# Patient Record
Sex: MALE | Race: WHITE | NOT HISPANIC OR LATINO
[De-identification: names, ages, dates, MRNs, and addresses within clinical notes are randomized per-mention and may not be internally consistent; named-entity substitution may affect disease eponyms.]

---

## 2018-01-03 ENCOUNTER — APPOINTMENT (OUTPATIENT)
Dept: GASTROENTEROLOGY | Facility: CLINIC | Age: 68
End: 2018-01-03
Payer: MEDICARE

## 2018-01-03 VITALS
WEIGHT: 195 LBS | HEART RATE: 78 BPM | TEMPERATURE: 98.4 F | OXYGEN SATURATION: 97 % | BODY MASS INDEX: 28.88 KG/M2 | SYSTOLIC BLOOD PRESSURE: 138 MMHG | DIASTOLIC BLOOD PRESSURE: 80 MMHG | HEIGHT: 69 IN

## 2018-01-03 DIAGNOSIS — Z82.49 FAMILY HISTORY OF ISCHEMIC HEART DISEASE AND OTHER DISEASES OF THE CIRCULATORY SYSTEM: ICD-10-CM

## 2018-01-03 DIAGNOSIS — Z80.51 FAMILY HISTORY OF MALIGNANT NEOPLASM OF KIDNEY: ICD-10-CM

## 2018-01-03 DIAGNOSIS — Z78.9 OTHER SPECIFIED HEALTH STATUS: ICD-10-CM

## 2018-01-03 DIAGNOSIS — Z87.891 PERSONAL HISTORY OF NICOTINE DEPENDENCE: ICD-10-CM

## 2018-01-03 PROCEDURE — 99213 OFFICE O/P EST LOW 20 MIN: CPT

## 2018-01-30 ENCOUNTER — APPOINTMENT (OUTPATIENT)
Dept: GASTROENTEROLOGY | Facility: AMBULATORY MEDICAL SERVICES | Age: 68
End: 2018-01-30
Payer: MEDICARE

## 2018-01-30 PROCEDURE — 45378 DIAGNOSTIC COLONOSCOPY: CPT

## 2018-02-02 ENCOUNTER — MESSAGE (OUTPATIENT)
Age: 68
End: 2018-02-02

## 2018-02-27 ENCOUNTER — APPOINTMENT (OUTPATIENT)
Dept: GASTROENTEROLOGY | Facility: AMBULATORY MEDICAL SERVICES | Age: 68
End: 2018-02-27

## 2018-02-27 ENCOUNTER — APPOINTMENT (OUTPATIENT)
Dept: GASTROENTEROLOGY | Facility: AMBULATORY MEDICAL SERVICES | Age: 68
End: 2018-02-27
Payer: MEDICARE

## 2018-02-27 PROCEDURE — 45380 COLONOSCOPY AND BIOPSY: CPT

## 2018-03-12 ENCOUNTER — APPOINTMENT (OUTPATIENT)
Dept: CARDIOLOGY | Facility: CLINIC | Age: 68
End: 2018-03-12
Payer: MEDICARE

## 2018-03-12 PROCEDURE — 93015 CV STRESS TEST SUPVJ I&R: CPT

## 2018-03-26 ENCOUNTER — APPOINTMENT (OUTPATIENT)
Dept: GASTROENTEROLOGY | Facility: CLINIC | Age: 68
End: 2018-03-26
Payer: MEDICARE

## 2018-03-26 VITALS
HEIGHT: 69 IN | BODY MASS INDEX: 28.58 KG/M2 | TEMPERATURE: 98.4 F | SYSTOLIC BLOOD PRESSURE: 118 MMHG | HEART RATE: 87 BPM | OXYGEN SATURATION: 96 % | WEIGHT: 193 LBS | DIASTOLIC BLOOD PRESSURE: 80 MMHG

## 2018-03-26 DIAGNOSIS — Z86.010 PERSONAL HISTORY OF COLONIC POLYPS: ICD-10-CM

## 2018-03-26 DIAGNOSIS — K57.30 DIVERTICULOSIS OF LARGE INTESTINE W/OUT PERFORATION OR ABSCESS W/OUT BLEEDING: ICD-10-CM

## 2018-03-26 DIAGNOSIS — K62.7 RADIATION PROCTITIS: ICD-10-CM

## 2018-03-26 PROCEDURE — 99213 OFFICE O/P EST LOW 20 MIN: CPT

## 2018-04-25 ENCOUNTER — RECORD ABSTRACTING (OUTPATIENT)
Age: 68
End: 2018-04-25

## 2018-04-25 DIAGNOSIS — N52.9 MALE ERECTILE DYSFUNCTION, UNSPECIFIED: ICD-10-CM

## 2018-04-25 DIAGNOSIS — K21.9 GASTRO-ESOPHAGEAL REFLUX DISEASE W/OUT ESOPHAGITIS: ICD-10-CM

## 2018-04-25 DIAGNOSIS — J30.2 OTHER SEASONAL ALLERGIC RHINITIS: ICD-10-CM

## 2018-04-25 DIAGNOSIS — G47.00 INSOMNIA, UNSPECIFIED: ICD-10-CM

## 2018-04-25 DIAGNOSIS — M54.12 RADICULOPATHY, CERVICAL REGION: ICD-10-CM

## 2018-04-25 RX ORDER — IPRATROPIUM BROMIDE AND ALBUTEROL 20; 100 UG/1; UG/1
20-100 SPRAY, METERED RESPIRATORY (INHALATION)
Qty: 4 | Refills: 0 | Status: ACTIVE | COMMUNITY
Start: 2017-10-18

## 2018-04-25 RX ORDER — SODIUM SULFATE, POTASSIUM SULFATE, MAGNESIUM SULFATE 17.5; 3.13; 1.6 G/ML; G/ML; G/ML
17.5-3.13-1.6 SOLUTION, CONCENTRATE ORAL
Qty: 1 | Refills: 0 | Status: DISCONTINUED | COMMUNITY
Start: 2018-01-03 | End: 2018-04-25

## 2018-04-25 RX ORDER — SODIUM SULFATE, POTASSIUM SULFATE, MAGNESIUM SULFATE 17.5; 3.13; 1.6 G/ML; G/ML; G/ML
17.5-3.13-1.6 SOLUTION, CONCENTRATE ORAL
Qty: 1 | Refills: 0 | Status: DISCONTINUED | COMMUNITY
Start: 2018-02-02 | End: 2018-04-25

## 2018-04-25 RX ORDER — FOLIC ACID 1 MG/1
1 TABLET ORAL DAILY
Qty: 90 | Refills: 3 | Status: ACTIVE | COMMUNITY
Start: 2017-10-30

## 2018-04-25 RX ORDER — MOMETASONE FUROATE MONOHYDRATE 50 UG/1
50 SPRAY, METERED NASAL
Refills: 0 | Status: ACTIVE | COMMUNITY

## 2018-04-25 RX ORDER — SILDENAFIL CITRATE 100 MG/1
TABLET, FILM COATED ORAL
Refills: 0 | Status: ACTIVE | COMMUNITY

## 2018-04-25 RX ORDER — MONTELUKAST SODIUM 10 MG/1
10 TABLET, FILM COATED ORAL DAILY
Qty: 90 | Refills: 0 | Status: ACTIVE | COMMUNITY

## 2018-04-25 RX ORDER — ZOLPIDEM TARTRATE 12.5 MG/1
12.5 TABLET, COATED ORAL
Refills: 0 | Status: ACTIVE | COMMUNITY

## 2018-04-25 RX ORDER — LOSARTAN POTASSIUM AND HYDROCHLOROTHIAZIDE 12.5; 5 MG/1; MG/1
50-12.5 TABLET ORAL DAILY
Refills: 0 | Status: ACTIVE | COMMUNITY
Start: 2017-08-01

## 2018-04-27 ENCOUNTER — APPOINTMENT (OUTPATIENT)
Dept: CARDIOLOGY | Facility: CLINIC | Age: 68
End: 2018-04-27
Payer: MEDICARE

## 2018-04-27 VITALS
WEIGHT: 190 LBS | DIASTOLIC BLOOD PRESSURE: 80 MMHG | HEART RATE: 74 BPM | HEIGHT: 69 IN | RESPIRATION RATE: 18 BRPM | SYSTOLIC BLOOD PRESSURE: 120 MMHG | BODY MASS INDEX: 28.14 KG/M2

## 2018-04-27 DIAGNOSIS — Z85.46 PERSONAL HISTORY OF MALIGNANT NEOPLASM OF PROSTATE: ICD-10-CM

## 2018-04-27 PROCEDURE — 93000 ELECTROCARDIOGRAM COMPLETE: CPT

## 2018-04-27 PROCEDURE — 99214 OFFICE O/P EST MOD 30 MIN: CPT | Mod: 25

## 2018-05-31 ENCOUNTER — APPOINTMENT (OUTPATIENT)
Dept: CARDIOLOGY | Facility: CLINIC | Age: 68
End: 2018-05-31
Payer: MEDICARE

## 2018-05-31 PROCEDURE — 93306 TTE W/DOPPLER COMPLETE: CPT

## 2018-07-23 PROBLEM — J30.2 SEASONAL ALLERGIES: Status: ACTIVE | Noted: 2018-04-25

## 2019-04-18 RX ORDER — OXYCODONE AND ACETAMINOPHEN 5; 325 MG/1; MG/1
5-325 TABLET ORAL
Qty: 30 | Refills: 0 | Status: ACTIVE | COMMUNITY
Start: 2017-10-30

## 2019-04-18 RX ORDER — TIZANIDINE 4 MG/1
4 TABLET ORAL
Qty: 30 | Refills: 0 | Status: ACTIVE | COMMUNITY
Start: 2017-08-01

## 2019-05-02 ENCOUNTER — APPOINTMENT (OUTPATIENT)
Dept: CARDIOLOGY | Facility: CLINIC | Age: 69
End: 2019-05-02
Payer: MEDICARE

## 2019-05-02 ENCOUNTER — TRANSCRIPTION ENCOUNTER (OUTPATIENT)
Age: 69
End: 2019-05-02

## 2019-05-02 ENCOUNTER — NON-APPOINTMENT (OUTPATIENT)
Age: 69
End: 2019-05-02

## 2019-05-02 VITALS
SYSTOLIC BLOOD PRESSURE: 120 MMHG | HEART RATE: 76 BPM | WEIGHT: 184 LBS | RESPIRATION RATE: 16 BRPM | BODY MASS INDEX: 27.25 KG/M2 | DIASTOLIC BLOOD PRESSURE: 70 MMHG | HEIGHT: 69 IN

## 2019-05-02 PROCEDURE — 99214 OFFICE O/P EST MOD 30 MIN: CPT

## 2019-05-02 PROCEDURE — 93000 ELECTROCARDIOGRAM COMPLETE: CPT

## 2019-05-02 RX ORDER — TAMSULOSIN HYDROCHLORIDE 0.4 MG/1
0.4 CAPSULE ORAL DAILY
Refills: 0 | Status: ACTIVE | COMMUNITY
Start: 2019-02-01

## 2019-05-02 NOTE — ASSESSMENT
[FreeTextEntry1] : ECG: Normal sinus rhythm at 76 beats per minute. Normal axis  1' AVB  No significant ischemic changes\par \par Laboratory data 3/20/19 call\par Cholesterol 138\par HDL 50\par LDL 71\par Electrolytes and LFTs are normal\par Hemoglobin 16.3\par \par CT of the chest 4/1/19\par Scattered atherosclerotic calcifications of the left coronary artery.\par Subcentimeter pulmonary nodules noted\par These are unchanged in comparison to a prior CAT scan of one year ago.\par \par Echocardiogram 5/31/18:\par Normal cardiac chamber sizes and function.\par Left ventricular ejection fraction 60%\par Mild dilatation of the ascending aorta 3.8-3.9 cm. Appears to be slightly increased in comparison to the prior echo.\par \par Stress test 3/12/18.\par Exercise 9 minutes (10 minutes). Peak heart rate 153\par Blood pressure response normal\par Chest associated with leg fatigue but no chest pain.\par ECG nonischemic.\par \par Impression\par 1. Hypertension seems to be quite adequately controlled on current regimen.\par 2. Exercise tolerance is reasonable without any evidence of exercise-induced ischemia.\par 3. Lipid  Seem adequately controlled\par 4. Weight (BMI 28) has remained a bit increased and further efforts with regard to diet and exercise should be applied to this.\par 5. Coronary atherosclerotic plaquing noted on the CT scan.\par 6. Mild dilatation of the ascending thoracic aorta.\par \par Plan:\par No indication for any change in cardiac management.\par Laboratory data to be obtained in 6 months with primary care physician.\par Repeat echocardiogram to reassess ascending thoracic aorta\par Patient to contact me to participate in his symptoms.

## 2019-05-02 NOTE — HISTORY OF PRESENT ILLNESS
[FreeTextEntry1] : Feels relatively well.\par Home blood pressure is 120/70 mm Hg\par No significant exertional chest pain, shortness of breath.Patient denies any chest pain, shortness of breath, palpitations, PND, orthopnea or edema\par \par Other medical history relates to his history of prostate cancer treated 10 years ago with radiation therapy and some chronic radiation  colitis\par \par Recent CT diagnosis of multiple subcentimeter pulmonary nodules. He is being followed as part of the [facility] program.

## 2019-05-02 NOTE — REASON FOR VISIT
[FreeTextEntry1] : Patient returns here for followup with regard to management of problems which include:\par \par 1. History of hypertension\par \par 2. History of hyperlipidemia\par \par 3. Followup with regard to a recent stress test\par \par \par

## 2019-10-01 RX ORDER — BUDESONIDE AND FORMOTEROL FUMARATE DIHYDRATE 160; 4.5 UG/1; UG/1
AEROSOL RESPIRATORY (INHALATION)
Refills: 0 | Status: ACTIVE | COMMUNITY

## 2019-10-03 ENCOUNTER — APPOINTMENT (OUTPATIENT)
Dept: CARDIOLOGY | Facility: CLINIC | Age: 69
End: 2019-10-03
Payer: MEDICARE

## 2019-10-03 ENCOUNTER — NON-APPOINTMENT (OUTPATIENT)
Age: 69
End: 2019-10-03

## 2019-10-03 VITALS
DIASTOLIC BLOOD PRESSURE: 80 MMHG | HEART RATE: 70 BPM | OXYGEN SATURATION: 96 % | WEIGHT: 180 LBS | BODY MASS INDEX: 26.66 KG/M2 | SYSTOLIC BLOOD PRESSURE: 110 MMHG | HEIGHT: 69 IN | RESPIRATION RATE: 16 BRPM

## 2019-10-03 DIAGNOSIS — Z00.00 ENCOUNTER FOR GENERAL ADULT MEDICAL EXAMINATION W/OUT ABNORMAL FINDINGS: ICD-10-CM

## 2019-10-03 PROCEDURE — 93000 ELECTROCARDIOGRAM COMPLETE: CPT

## 2019-10-03 PROCEDURE — 99214 OFFICE O/P EST MOD 30 MIN: CPT

## 2019-10-03 NOTE — REVIEW OF SYSTEMS
[Heartburn] : heartburn [Urinary Frequency] : urinary frequency [Negative] : Heme/Lymph [Recent Weight Loss (___ Lbs)] : recent [unfilled] ~Ulb weight loss

## 2019-10-03 NOTE — ASSESSMENT
[FreeTextEntry1] : ECG: Normal sinus rhythm at 70 beats per minute and WNL.\par \par Laboratory data 3/20/19 \par Cholesterol 138\par HDL 50\par LDL 71\par Electrolytes and LFTs are normal\par Hemoglobin 16.3\par \par CT of the chest 4/1/19\par Scattered atherosclerotic calcifications of the left coronary artery.\par Subcentimeter pulmonary nodules noted\par These are unchanged in comparison to a prior CAT scan of one year ago.\par \par Echocardiogram 5/31/18:\par Normal cardiac chamber sizes and function.\par Left ventricular ejection fraction 60%\par Mild dilatation of the ascending aorta 3.8-3.9 cm. Appears to be slightly increased in comparison to the prior echo.\par \par Stress test 3/12/18.\par Exercise 9 minutes (10 minutes). Peak heart rate 153\par Blood pressure response normal\par Chest associated with leg fatigue but no chest pain.\par ECG nonischemic.\par \par Impression\par 1. Hypertension seems to be quite adequately controlled on current regimen. Today 110/80\par 2. Exercise tolerance is reasonable without any evidence of exercise-induced ischemia.\par 3. March 2019  lipids  Seem adequately controlled\par 4. Down 4 lbs since May, BMI now 26.\par 5. Coronary atherosclerotic plaquing noted on the CT scan competed in April of 2019.\par 6. Mild dilatation of the ascending thoracic aorta in 2018 echo\par \par Plan:\par 1.No indication for any change in cardiac management.\par 2. Will obtain lab data for review from PCP\par 3.Repeat echocardiogram  now to reassess ascending thoracic aorta\par \par \par Patient to contact me to participate in his symptoms.\par \par Clinical follow up in 6 months

## 2019-10-03 NOTE — HISTORY OF PRESENT ILLNESS
[FreeTextEntry1] : Continue to feels relatively well.\par \par Recently had imaging of his brain completed for forgetfulness, this and his most recent blood work results are not available for review.\par \par S/P ablation for what sounds like a cervical radiculopathy.\par \par Physically active, goes to the 3 times a week and now participates in yoga. There is no exertional discomfort. \par \par No significant exertional chest pain, shortness of breath.Patient denies any chest pain, shortness of breath, palpitations, PND, orthopnea or edema\par \par Other medical history relates to his history of prostate cancer treated 10 years ago with radiation therapy and some chronic radiation  colitis\par \par Recent CT diagnosis of multiple subcentimeter pulmonary nodules. He is being followed as part of the [facility] program.\par \par

## 2019-10-30 ENCOUNTER — APPOINTMENT (OUTPATIENT)
Dept: CARDIOLOGY | Facility: CLINIC | Age: 69
End: 2019-10-30
Payer: MEDICARE

## 2019-10-30 PROCEDURE — 93306 TTE W/DOPPLER COMPLETE: CPT

## 2020-04-02 RX ORDER — ESOMEPRAZOLE MAGNESIUM 20 MG/1
20 CAPSULE, DELAYED RELEASE ORAL DAILY
Refills: 0 | Status: ACTIVE | COMMUNITY

## 2020-04-02 NOTE — REVIEW OF SYSTEMS
[Recent Weight Loss (___ Lbs)] : recent [unfilled] ~Ulb weight loss [Heartburn] : heartburn [Urinary Frequency] : urinary frequency [Negative] : Heme/Lymph

## 2020-04-07 ENCOUNTER — APPOINTMENT (OUTPATIENT)
Dept: CARDIOLOGY | Facility: CLINIC | Age: 70
End: 2020-04-07

## 2020-04-07 ENCOUNTER — APPOINTMENT (OUTPATIENT)
Dept: CARDIOLOGY | Facility: CLINIC | Age: 70
End: 2020-04-07
Payer: MEDICARE

## 2020-04-07 PROCEDURE — 99214 OFFICE O/P EST MOD 30 MIN: CPT | Mod: 95

## 2020-04-07 RX ORDER — NAPROXEN 500 MG/1
TABLET ORAL
Refills: 0 | Status: ACTIVE | COMMUNITY

## 2020-04-07 NOTE — ASSESSMENT
[FreeTextEntry1] : Lab data 12/11/19:\par Cholesterol 132\par HDL 42\par LDL 70\par Triglycerides 122\par LFTs electrolytes are normal\par \par \par Laboratory data 3/20/19 \par Cholesterol 138\par HDL 50\par LDL 71\par Electrolytes and LFTs are normal\par Hemoglobin 16.3\par \par Echocardiogram 10/30/19:\par Normal LV size and systolic function with evidence of diastolic dysfunction.\par Mild dilatation of the ascending aorta 4.1 cm increase of 3.9 cm.\par No significant valvular abnormalities.\par \par CT of the chest 4/1/19\par Scattered atherosclerotic calcifications of the left coronary artery.\par Subcentimeter pulmonary nodules noted\par These are unchanged in comparison to a prior CAT scan of one year ago.\par \par Echocardiogram 5/31/18:\par Normal cardiac chamber sizes and function.\par Left ventricular ejection fraction 60%\par Mild dilatation of the ascending aorta 3.8-3.9 cm. Appears to be slightly increased in comparison to the prior echo.\par \par Stress test 3/12/18.\par Exercise 9 minutes (10 minutes). Peak heart rate 153\par Blood pressure response normal\par Chest associated with leg fatigue but no chest pain.\par ECG nonischemic.\par \par Impression\par 1. Hypertension seems to be quite adequately controlled on current regimen. 110/80 mm Hg at home\par 2. Exercise tolerance is reasonable without any evidence of exercise-induced ischemia.\par 3. lipids  Seem adequately controlled though cannot get labs at this time\par 4. Maintaining weight at 180 lbs\par 5. Coronary atherosclerotic plaquing noted on the CT scan competed in April of 2019.\par 6. Mild dilatation of the ascending thoracic aorta with slight increase\par \par Plan:\par 1.No indication for any change in cardiac management.\par 2. Will obtain lab data for review when available\par 3.Repeat echocardiogram  now to reassess ascending thoracic aorta 10/20\par 4. f/U 6 months\par \par Patient to contact me to participate in his symptoms.\par \par Clinical follow up in 6 months

## 2020-04-07 NOTE — REASON FOR VISIT
[FreeTextEntry1] : Patient for followup with regard to management of problems which include:\par \par 1. History of hypertension\par \par 2. History of hyperlipidemia\par \par 3.Dilated ascending aorta last echo 10/30/19\par \par \par

## 2020-04-07 NOTE — PHYSICAL EXAM
[General Appearance - Well Developed] : well developed [Normal Appearance] : normal appearance [General Appearance - Well Nourished] : well nourished [Normal Conjunctiva] : the conjunctiva exhibited no abnormalities [Normal Oral Mucosa] : normal oral mucosa [JVD Elevated _____cm] : JVD elevated [unfilled] ~U cm above clavicle [] : no respiratory distress [Respiration, Rhythm And Depth] : normal respiratory rhythm and effort [Skin Color & Pigmentation] : normal skin color and pigmentation [Skin Turgor] : normal skin turgor [Oriented To Time, Place, And Person] : oriented to person, place, and time [Impaired Insight] : insight and judgment were intact [No Anxiety] : not feeling anxious

## 2020-04-07 NOTE — HISTORY OF PRESENT ILLNESS
[FreeTextEntry1] : Continue to feels relatively well.\par \par Recently had imaging of his brain completed for forgetfulness, this and his most recent blood work results are not available for review.\par \par S/P ablation for what sounds like a cervical radiculopathy.\par \par Physically active, goes to the 3 times a week and now participates in yoga. There is no exertional discomfort. Walking frequently\par \par No significant exertional chest pain, shortness of breath.Patient denies any chest pain, shortness of breath, palpitations, PND, orthopnea or edema\par \par Other medical history relates to his history of prostate cancer treated 10 years ago with radiation therapy and some chronic radiation  colitis\par \par Recent CT diagnosis of multiple subcentimeter pulmonary nodules. He is being followed as part of the [facility] program.\par \par Unable to go get the recent lab work

## 2020-10-25 ENCOUNTER — NON-APPOINTMENT (OUTPATIENT)
Age: 70
End: 2020-10-25

## 2020-10-28 ENCOUNTER — APPOINTMENT (OUTPATIENT)
Dept: CARDIOLOGY | Facility: CLINIC | Age: 70
End: 2020-10-28
Payer: MEDICARE

## 2020-10-28 ENCOUNTER — NON-APPOINTMENT (OUTPATIENT)
Age: 70
End: 2020-10-28

## 2020-10-28 VITALS
DIASTOLIC BLOOD PRESSURE: 80 MMHG | OXYGEN SATURATION: 98 % | BODY MASS INDEX: 28.14 KG/M2 | HEART RATE: 87 BPM | HEIGHT: 69 IN | SYSTOLIC BLOOD PRESSURE: 125 MMHG | TEMPERATURE: 98 F | WEIGHT: 190 LBS | RESPIRATION RATE: 16 BRPM

## 2020-10-28 PROCEDURE — 99214 OFFICE O/P EST MOD 30 MIN: CPT

## 2020-10-28 PROCEDURE — 93000 ELECTROCARDIOGRAM COMPLETE: CPT

## 2020-10-28 NOTE — REVIEW OF SYSTEMS
[Heartburn] : heartburn [Urinary Frequency] : urinary frequency [Negative] : Heme/Lymph [Recent Weight Gain (___ Lbs)] : recent [unfilled] ~Ulb weight gain [Recent Weight Loss (___ Lbs)] : no recent weight loss

## 2020-10-28 NOTE — HISTORY OF PRESENT ILLNESS
[FreeTextEntry1] : Continue to feels relatively well.\par \par S/P ablation for what sounds like a cervical radiculopathy.\par \par Physically active, takes long walks at least  3 times a week and now participates in yoga. There is no exertional discomfort. Walking frequently\par \par No significant exertional chest pain, shortness of breath.Patient denies any chest pain, shortness of breath, palpitations, PND, orthopnea or edema\par \par Other medical history relates to his history of prostate cancer treated 10 years ago with radiation therapy and some chronic radiation  colitis\par \par Recent CT diagnosis of multiple subcentimeter pulmonary nodules. He is being followed as part of the [facility] program.\par \par Unable to go get the recent lab work

## 2020-10-28 NOTE — ASSESSMENT
[FreeTextEntry1] : \par ECG: Normal sinus rhythm at 87 bpm.  First-degree block.  No skin of ST-T wave changes.\par \par \par \par Lab data \par          12/11/19:      10/21/20\par Chol      132               126\par HDL        42                49\par LDL        70                58\par Trigl      122               103\par LFTs electrolytes are normal\par \par \par Laboratory data 3/20/19 \par Cholesterol 138\par HDL 50\par LDL 71\par Electrolytes and LFTs are normal\par Hemoglobin 16.3\par \par Echocardiogram 10/30/19:\par Normal LV size and systolic function with evidence of diastolic dysfunction.\par Mild dilatation of the ascending aorta 4.1 cm increase of 3.9 cm.\par No significant valvular abnormalities.\par \par CT of the chest 4/1/19\par Scattered atherosclerotic calcifications of the left coronary artery.\par Subcentimeter pulmonary nodules noted\par These are unchanged in comparison to a prior CAT scan of one year ago.\par \par Echocardiogram 5/31/18:\par Normal cardiac chamber sizes and function.\par Left ventricular ejection fraction 60%\par Mild dilatation of the ascending aorta 3.8-3.9 cm. Appears to be slightly increased in comparison to the prior echo.\par \par Stress test 3/12/18.\par Exercise 9 minutes (10 minutes). Peak heart rate 153\par Blood pressure response normal\par Chest associated with leg fatigue but no chest pain.\par ECG nonischemic.\par \par Impression\par 1. Hypertension seems to be quite adequately controlled on current regimen. \par 2. Exercise tolerance is reasonable without any evidence of exercise-induced ischemia.\par 3. lipids  Seem adequately controlled \par 4. increased  weight by 10 lbs to 190 lbs\par 5. Coronary atherosclerotic plaquing noted on the CT scan competed in April of 2019.\par 6. Mild dilatation of the ascending thoracic aorta with slight increase, not rechecked\par \par Plan:\par 1.No indication for any change in cardiac management.\par 2.  We will schedule exercise stress test at 3-year juncture to reassess the coronary artery calcifications and atherosclerosis.\par 3.Repeat echocardiogram  to reassess ascending thoracic aorta 10/20\par 4. f/U 6 months\par \par Clinical follow up in 6 months

## 2021-04-02 ENCOUNTER — APPOINTMENT (OUTPATIENT)
Dept: CARDIOLOGY | Facility: CLINIC | Age: 71
End: 2021-04-02
Payer: MEDICARE

## 2021-04-02 VITALS — SYSTOLIC BLOOD PRESSURE: 120 MMHG | DIASTOLIC BLOOD PRESSURE: 75 MMHG

## 2021-04-02 PROCEDURE — 93306 TTE W/DOPPLER COMPLETE: CPT

## 2021-04-02 PROCEDURE — 93015 CV STRESS TEST SUPVJ I&R: CPT

## 2021-04-08 ENCOUNTER — APPOINTMENT (OUTPATIENT)
Dept: CARDIOLOGY | Facility: CLINIC | Age: 71
End: 2021-04-08
Payer: MEDICARE

## 2021-04-08 VITALS
HEART RATE: 75 BPM | OXYGEN SATURATION: 97 % | BODY MASS INDEX: 26.66 KG/M2 | HEIGHT: 69 IN | RESPIRATION RATE: 16 BRPM | SYSTOLIC BLOOD PRESSURE: 130 MMHG | TEMPERATURE: 98 F | DIASTOLIC BLOOD PRESSURE: 82 MMHG | WEIGHT: 180 LBS

## 2021-04-08 PROCEDURE — 99214 OFFICE O/P EST MOD 30 MIN: CPT

## 2021-04-08 PROCEDURE — 93000 ELECTROCARDIOGRAM COMPLETE: CPT

## 2021-04-08 NOTE — REVIEW OF SYSTEMS
[Heartburn] : heartburn [Urinary Frequency] : urinary frequency [Negative] : Heme/Lymph [Recent Weight Gain (___ Lbs)] : recent [unfilled] ~Ulb weight gain [Recent Weight Loss (___ Lbs)] : recent [unfilled] ~Ulb weight loss [Joint Pain] : joint pain [Joint Swelling] : joint swelling [Joint Stiffness] : joint stiffness

## 2021-04-08 NOTE — HISTORY OF PRESENT ILLNESS
[FreeTextEntry1] : Continue to feels relatively well.\par \par S/P ablation for what sounds like a cervical radiculopathy.\par \par Physically active, takes long walks at least  3 times a week and now participates in yoga. There is no exertional discomfort. Walking frequently\par \par No significant exertional chest pain, shortness of breath.Patient denies any chest pain, shortness of breath, palpitations, PND, orthopnea or edema\par \par Other medical history relates to his history of prostate cancer treated 10 years ago with radiation therapy and some chronic radiation  colitis\par \par 4/6/21 CT with unchanged multiple subcentimeter pulmonary nodules.\par            Mild coronary artery calcifications. He is being followed as part of the [facility] program.\par \par BW, ECHO and EST will be reviewed. Believes he could have done better but his knees were giving him some discomfort. \par \par

## 2021-04-08 NOTE — ASSESSMENT
[FreeTextEntry1] : ECG: Normal sinus rhythm at 75 bpm.  Normal axis intervals and no significant ST or T wave changes\par \par Lab data  3/9/21\par Chol. 123\par HDL   46\par LDL    52\par Tri. 123\par Creat. 0.99\par \par Laboratory data 3/20/19 \par Cholesterol 138\par HDL 50\par LDL 71\par Electrolytes and LFTs are normal\par Hemoglobin 16.3\par \par CT of the chest 4/6/2021\par Scattered atherosclerotic calcifications of the left coronary artery.\par Subcentimeter pulmonary nodules noted\par These are unchanged in comparison to a prior CAT scan of one year ago.\par \par Echocardiogram 4/2/2021:\par Normal LV size and function\par  mild dilatation of the aortic root 4.2 cm\par No significant valvular heart disease.\par \par Echocardiogram 10/30/19:\par Normal LV size and systolic function with evidence of diastolic dysfunction.\par Mild dilatation of the ascending aorta 4.1 cm increase of 3.9 cm.\par No significant valvular abnormalities.\par \par Echocardiogram 5/31/18:\par Normal cardiac chamber sizes and function.\par Left ventricular ejection fraction 60%\par Mild dilatation of the ascending aorta 3.8-3.9 cm. Appears to be slightly increased in comparison to the prior echo.\par \par Exercise stress test 4/2/2021:\par Exercise 9 minutes 20 seconds (10 METS)\par Peak heart rate 142 (96%)\par Blood pressure response mildly hypertensive 182/94\par Occasional PVCs and exercise.\par ECG negative for ischemia.\par Duke score 9 low risk\par \par Stress test 3/12/18.\par Exercise 9 minutes (10 minutes). Peak heart rate 153\par Blood pressure response normal\par Chest associated with leg fatigue but no chest pain.\par ECG nonischemic.\par \par Impression\par 1. Hypertension seems to be quite adequately controlled on current regimen. ECG SR. \par 2. Exercise tolerance is reasonable without any evidence of exercise-induced ischemia.\par 3. No SOB or chest discomfort at rest or with exertion.\par 4. Down 10 lbs since October.lipids continue to be controlled. \par 5. Coronary atherosclerotic plaquing noted on the CT scan -  April of 2019.\par 6. Mild dilatation of the ascending thoracic aorta  and unchanged. \par     This finding does not appear on the CT report.  Uncertain why.  \par 7. CT with mild coronary plaquing but on statin therapy to prevent progression, this is not new.  \par \par Plan:\par 1. No indication for any change in cardiac management.\par 2. BW through PCP.\par 3. Repeat echocardiogram  to reassess ascending thoracic aorta 1 year\par \par \par Clinical follow up in 6 months

## 2021-10-05 ENCOUNTER — APPOINTMENT (OUTPATIENT)
Dept: CARDIOLOGY | Facility: CLINIC | Age: 71
End: 2021-10-05
Payer: MEDICARE

## 2021-10-05 VITALS
BODY MASS INDEX: 26.96 KG/M2 | SYSTOLIC BLOOD PRESSURE: 122 MMHG | HEIGHT: 69 IN | HEART RATE: 77 BPM | WEIGHT: 182 LBS | RESPIRATION RATE: 16 BRPM | OXYGEN SATURATION: 98 % | DIASTOLIC BLOOD PRESSURE: 80 MMHG

## 2021-10-05 PROCEDURE — 99214 OFFICE O/P EST MOD 30 MIN: CPT

## 2021-10-05 PROCEDURE — 93000 ELECTROCARDIOGRAM COMPLETE: CPT

## 2021-10-05 NOTE — HISTORY OF PRESENT ILLNESS
[FreeTextEntry1] : Patient had one episode of short-lived fleeting left parasternal chest pain several weeks ago.  Nonexertional nor associated with any other symptoms.  It has not reoccurred.  \par Continue to feels relatively well.\par \par S/P ablation for what sounds like a cervical radiculopathy.\par \par Physically active, takes long walks at least  3 times a week and now participates in yoga. There is no exertional discomfort. Walking frequently\par \par No significant exertional chest pain, shortness of breath.Patient denies any chest pain, shortness of breath, palpitations, PND, orthopnea or edema\par \par Other medical history relates to his history of prostate cancer treated 10 years ago with radiation therapy and some chronic radiation  colitis\par \par 4/6/21 CT with unchanged multiple subcentimeter pulmonary nodules.\par            Mild coronary artery calcifications. He is being followed as part of the [facility] program.\par \par BW, ECHO and EST will be reviewed. Believes he could have done better but his knees were giving him some discomfort. \par \par

## 2021-10-05 NOTE — ASSESSMENT
[FreeTextEntry1] : ECG: Normal sinus rhythm at 77 bpm.  Normal axis intervals and no significant ST or T wave changes\par \par Lab data  \par        3/9/21              7/16/21 \par Chol. 123                   128    \par HDL   46                      46\par LDL    52                     63\par Tri. 123\par Creat. 0.99\par \par Laboratory data 3/20/19 \par Cholesterol 138\par HDL 50\par LDL 71\par Electrolytes and LFTs are normal\par Hemoglobin 16.3\par \par CT of the chest 4/6/2021\par Scattered atherosclerotic calcifications of the left coronary artery.\par Subcentimeter pulmonary nodules noted\par These are unchanged in comparison to a prior CAT scan of one year ago.\par \par Echocardiogram 4/2/2021:\par Normal LV size and function\par  mild dilatation of the aortic root 4.2 cm\par No significant valvular heart disease.\par \par Echocardiogram 10/30/19:\par Normal LV size and systolic function with evidence of diastolic dysfunction.\par Mild dilatation of the ascending aorta 4.1 cm increase of 3.9 cm.\par No significant valvular abnormalities.\par \par Echocardiogram 5/31/18:\par Normal cardiac chamber sizes and function.\par Left ventricular ejection fraction 60%\par Mild dilatation of the ascending aorta 3.8-3.9 cm. Appears to be slightly increased in comparison to the prior echo.\par \par Exercise stress test 4/2/2021:\par Exercise 9 minutes 20 seconds (10 METS)\par Peak heart rate 142 (96%)\par Blood pressure response mildly hypertensive 182/94\par Occasional PVCs and exercise.\par ECG negative for ischemia.\par Duke score 9 low risk\par \par Stress test 3/12/18.\par Exercise 9 minutes (10 minutes). Peak heart rate 153\par Blood pressure response normal\par Chest associated with leg fatigue but no chest pain.\par ECG nonischemic.\par \par Impression\par 1. Hypertension seems to be quite adequately controlled on current regimen. \par \par 2. Exercise tolerance is reasonable without any evidence of exercise-induced ischemia on the last stress test\par \par 3. No SOB or chest discomfort at rest or with exertion.  The singular episode of chest pain was highly atypical.\par \par 4.  Essentially maintaining his weight and lipids continue to be controlled. \par \par 5. Coronary atherosclerotic plaquing noted on the CT scan -  April of 2019.  Consistent with a likely diagnosis of     subclinical atherosclerotic coronary disease/ On statin therapy to prevent progression,\par \par 6. Mild dilatation of the ascending thoracic aorta  and unchanged. \par     This finding does not appear on the CT report.  Uncertain why. \par  \par \par \par Plan:\par 1. No indication for any change in cardiac management.\par 2. BW through PCP.\par 3. Repeat echocardiogram  to reassess ascending thoracic aorta and abdominal sonogram to check the abdominal aorta.  (Father passed of a ruptured AAA)\par 4.  Exercise stress test planned in the spring\par \par \par \par \par Clinical follow up in 6 months

## 2021-10-05 NOTE — PHYSICAL EXAM
[General Appearance - Well Developed] : well developed [Normal Appearance] : normal appearance [General Appearance - Well Nourished] : well nourished [Normal Conjunctiva] : the conjunctiva exhibited no abnormalities [Normal Oral Mucosa] : normal oral mucosa [JVD Elevated _____cm] : JVD elevated [unfilled] ~U cm above clavicle [] : no respiratory distress [Skin Color & Pigmentation] : normal skin color and pigmentation [Respiration, Rhythm And Depth] : normal respiratory rhythm and effort [Skin Turgor] : normal skin turgor [Oriented To Time, Place, And Person] : oriented to person, place, and time [Impaired Insight] : insight and judgment were intact [No Anxiety] : not feeling anxious

## 2022-01-09 ENCOUNTER — NON-APPOINTMENT (OUTPATIENT)
Age: 72
End: 2022-01-09

## 2022-04-05 ENCOUNTER — APPOINTMENT (OUTPATIENT)
Dept: CARDIOLOGY | Facility: CLINIC | Age: 72
End: 2022-04-05
Payer: MEDICARE

## 2022-04-05 PROCEDURE — 93306 TTE W/DOPPLER COMPLETE: CPT

## 2022-04-05 PROCEDURE — 93978 VASCULAR STUDY: CPT

## 2022-04-08 ENCOUNTER — APPOINTMENT (OUTPATIENT)
Dept: CARDIOLOGY | Facility: CLINIC | Age: 72
End: 2022-04-08
Payer: MEDICARE

## 2022-04-08 PROCEDURE — 93015 CV STRESS TEST SUPVJ I&R: CPT

## 2022-04-14 ENCOUNTER — APPOINTMENT (OUTPATIENT)
Dept: CARDIOLOGY | Facility: CLINIC | Age: 72
End: 2022-04-14
Payer: MEDICARE

## 2022-04-14 VITALS
SYSTOLIC BLOOD PRESSURE: 98 MMHG | WEIGHT: 182 LBS | HEIGHT: 69 IN | RESPIRATION RATE: 16 BRPM | HEART RATE: 68 BPM | BODY MASS INDEX: 26.96 KG/M2 | OXYGEN SATURATION: 98 % | DIASTOLIC BLOOD PRESSURE: 62 MMHG

## 2022-04-14 PROCEDURE — 99214 OFFICE O/P EST MOD 30 MIN: CPT

## 2022-04-14 PROCEDURE — 93000 ELECTROCARDIOGRAM COMPLETE: CPT

## 2022-04-14 NOTE — HISTORY OF PRESENT ILLNESS
[FreeTextEntry1] : Continue to feels relatively well.\par Physically active, takes long walks at least  3 times a week and now participates in yoga. There is no exertional discomfort. Walking frequently\par \par No significant exertional chest pain, shortness of breath.Patient denies any chest pain, shortness of breath, palpitations, PND, orthopnea or edema\par \par Other medical history relates to his history of prostate cancer treated 10 years ago with radiation therapy and some chronic radiation  colitis\par S/P ablation for what sounds like a cervical radiculopathy.\par \par 4/6/21 CT with unchanged multiple subcentimeter pulmonary nodules.\par            Mild coronary artery calcifications. He is being followed as part of the [facility] program.\par \par BW, ECHO, Aorta and EST will be reviewed. Believes he could have done better but his knees were giving him some discomfort. \par \par

## 2022-04-14 NOTE — ASSESSMENT
[FreeTextEntry1] : ECG: Normal sinus rhythm at 68 bpm.  Normal axis intervals and no significant ST or T wave changes\par \par Lab data  \par ----3/9/21---7/16/21--\par Chol---123-----128---136\par HDL----46-------46----52\par LDL-----52 ----- 63---66\par Tri-----123\par Creat. 0.99\par \par Laboratory data 3/20/19 \par Cholesterol 138\par HDL 50\par LDL 71\par Electrolytes and LFTs are normal\par Hemoglobin 16.3\par \par Abdominal aorta 4/5/2022:\par No evidence of ectasia or enlargement.\par Minimal calcified plaque in the mid abdominal aorta.\par Otherwise unremarkable study\par \par Echocardiogram 4/5/2022:\par Normal left ventricular size wall thickness and function ejection fraction 60 to 65%\par Mild mitral regurgitation\par Mobile interatrial septum without shunt\par Mild dilatation ascending aorta 4 cm\par \par CT of the chest 4/6/2021\par Scattered atherosclerotic calcifications of the left coronary artery.\par Subcentimeter pulmonary nodules noted\par These are unchanged in comparison to a prior CAT scan of one year ago.\par \par Echocardiogram 4/2/2021:\par Normal LV size and function\par  mild dilatation of the aortic root 4.2 cm\par No significant valvular heart disease.\par \par Echocardiogram 10/30/19:\par Normal LV size and systolic function with evidence of diastolic dysfunction.\par Mild dilatation of the ascending aorta 4.1 cm increase of 3.9 cm.\par No significant valvular abnormalities.\par \par Echocardiogram 5/31/18:\par Normal cardiac chamber sizes and function.\par Left ventricular ejection fraction 60%\par Mild dilatation of the ascending aorta 3.8-3.9 cm. Appears to be slightly increased in comparison to the prior echo.\par \par Stress test 4/8/2022:\par Time: 9 minutes 20 seconds (10 METS)\par Heart rate: 146 bpm (99%)\par Blood pressure: 160/84 mmHg\par ECG: Normal no ischemic changes.\par Duke score 9 average\par \par Exercise stress test 4/2/2021:\par Exercise 9 minutes 20 seconds (10 METS)\par Peak heart rate 142 (96%)\par Blood pressure response mildly hypertensive 182/94\par Occasional PVCs and exercise.\par ECG negative for ischemia.\par Duke score 9 low risk\par \par Stress test 3/12/18.\par Exercise 9 minutes (10 minutes). Peak heart rate 153\par Blood pressure response normal\par Chest associated with leg fatigue but no chest pain.\par ECG nonischemic.\par \par Impression\par 1. Hypertension seems to be quite adequately controlled on current regimen. \par \par 2. Exercise tolerance is reasonable without any evidence of exercise-induced ischemia.  Functional capacity and other metrics are the same as on prior stress testing 1 year ago\par \par 3. No SOB or chest discomfort at rest or with exertion.  The singular episode of chest pain was highly atypical.\par \par 4.  Essentially maintaining his weight and lipids continue to be controlled. \par \par 5. Coronary atherosclerotic plaquing noted on the CT scan -  April of 2019.  Consistent with a likely diagnosis of     subclinical atherosclerotic coronary disease/ On statin therapy to prevent progression,\par \par 6. Mild dilatation of the ascending thoracic aorta  and unchanged. \par \par 7.  No evidence of an abdominal aortic aneurysm despite family history of such    \par  \par Plan:\par 1. No indication for any change in cardiac management.\par 2. BW through PCP.\par 3.  Clinical follow-up here in 6 months.\par

## 2022-04-14 NOTE — REASON FOR VISIT
[FreeTextEntry1] : Patient for followup with regard to management of problems which include:\par \par 1. History of hypertension\par \par 2. History of hyperlipidemia\par \par 3.Dilated ascending aorta \par \par \par

## 2022-10-20 ENCOUNTER — APPOINTMENT (OUTPATIENT)
Dept: CARDIOLOGY | Facility: CLINIC | Age: 72
End: 2022-10-20

## 2022-10-20 VITALS
OXYGEN SATURATION: 97 % | HEART RATE: 67 BPM | HEIGHT: 69 IN | SYSTOLIC BLOOD PRESSURE: 120 MMHG | DIASTOLIC BLOOD PRESSURE: 80 MMHG | WEIGHT: 184 LBS | RESPIRATION RATE: 16 BRPM | BODY MASS INDEX: 27.25 KG/M2

## 2022-10-20 PROCEDURE — 99214 OFFICE O/P EST MOD 30 MIN: CPT

## 2022-10-20 PROCEDURE — 93000 ELECTROCARDIOGRAM COMPLETE: CPT

## 2022-10-20 NOTE — ASSESSMENT
[FreeTextEntry1] : ECG: Normal sinus rhythm at 67 bpm.  Normal axis intervals and no significant ST or T wave changes\par \par Lab data  \par ----3/9/21---7/16/21--4/23/22--10/14/22\par Chol---123-----128------141-------128\par HDL----46-------46-------48---------45\par LDL-----52 ----- 63-------76---------61\par Tri-----123\par Creat. 0.99\par \par Laboratory data 3/20/19 \par Cholesterol 138\par HDL 50\par LDL 71\par Electrolytes and LFTs are normal\par Hemoglobin 16.3\par \par Abdominal aorta 4/5/2022:\par No evidence of ectasia or enlargement.\par Minimal calcified plaque in the mid abdominal aorta.\par Otherwise unremarkable study\par \par Echocardiogram 4/5/2022:\par Normal left ventricular size wall thickness and function ejection fraction 60 to 65%\par Mild mitral regurgitation\par Mobile interatrial septum without shunt\par Mild dilatation ascending aorta 4 cm\par \par CT of the chest 4/6/2021\par Scattered atherosclerotic calcifications of the left coronary artery.\par Subcentimeter pulmonary nodules noted\par These are unchanged in comparison to a prior CAT scan of one year ago.\par \par Echocardiogram 4/2/2021:\par Normal LV size and function\par  mild dilatation of the aortic root 4.2 cm\par No significant valvular heart disease.\par \par Echocardiogram 10/30/19:\par Normal LV size and systolic function with evidence of diastolic dysfunction.\par Mild dilatation of the ascending aorta 4.1 cm increase of 3.9 cm.\par No significant valvular abnormalities.\par \par Echocardiogram 5/31/18:\par Normal cardiac chamber sizes and function.\par Left ventricular ejection fraction 60%\par Mild dilatation of the ascending aorta 3.8-3.9 cm. Appears to be slightly increased in comparison to the prior echo.\par \par Stress test 4/8/2022:\par Time: 9 minutes 20 seconds (10 METS)\par Heart rate: 146 bpm (99%)\par Blood pressure: 160/84 mmHg\par ECG: Normal no ischemic changes.\par Duke score 9 average\par \par Exercise stress test 4/2/2021:\par Exercise 9 minutes 20 seconds (10 METS)\par Peak heart rate 142 (96%)\par Blood pressure response mildly hypertensive 182/94\par Occasional PVCs and exercise.\par ECG negative for ischemia.\par Duke score 9 low risk\par \par Stress test 3/12/18.\par Exercise 9 minutes (10 minutes). Peak heart rate 153\par Blood pressure response normal\par Chest associated with leg fatigue but no chest pain.\par ECG nonischemic.\par \par Impression\par 1. Hypertension seems to be quite adequately controlled on current regimen. \par \par 2. Exercise tolerance is reasonable without any evidence of exercise-induced ischemia.  Functional capacity and other metrics are the same as on prior stress testing 1 year ago\par \par 3. No SOB or chest discomfort at rest or with exertion.  The singular episode of chest pain was highly atypical.\par \par 4.  Essentially maintaining his weight and lipids continue to be controlled. \par \par 5. Coronary atherosclerotic plaquing noted on the CT scan -  April of 2019.  Consistent with a likely diagnosis of         subclinical atherosclerotic coronary disease/ On statin therapy to prevent progression,\par \par 6. Mild dilatation of the ascending thoracic aorta  and unchanged. \par \par 7.  No evidence of an abdominal aortic aneurysm despite family history of such    \par  \par Plan:\par 1. No indication for any change in cardiac management.\par 2. BW through PCP.\par 3.  Clinical follow-up here in 6 months.\par

## 2022-10-20 NOTE — HISTORY OF PRESENT ILLNESS
[FreeTextEntry1] : Continue to feels relatively well.\par Physically active, takes long walks at least  3 times a week and now participates in yoga. There is no exertional discomfort. Walking frequently\par \par No significant exertional chest pain, shortness of breath.Patient denies any chest pain, shortness of breath, palpitations, PND, orthopnea or edema\par \par Other medical history relates to his history of prostate cancer treated 10 years ago with radiation therapy and some chronic radiation  colitis\par S/P ablation for what sounds like a cervical radiculopathy.\par \par 4/6/21 CT with unchanged multiple subcentimeter pulmonary nodules.\par            Mild coronary artery calcifications. He is being followed as part of the [facility] program.\par \par \par

## 2022-10-20 NOTE — PHYSICAL EXAM
[General Appearance - Well Developed] : well developed [Normal Appearance] : normal appearance [General Appearance - Well Nourished] : well nourished [Normal Conjunctiva] : the conjunctiva exhibited no abnormalities [Normal Oral Mucosa] : normal oral mucosa [JVD Elevated _____cm] : JVD elevated [unfilled] ~U cm above clavicle [] : no respiratory distress [Respiration, Rhythm And Depth] : normal respiratory rhythm and effort [Skin Color & Pigmentation] : normal skin color and pigmentation [Skin Turgor] : normal skin turgor [Oriented To Time, Place, And Person] : oriented to person, place, and time reviewed triage and pmh [Impaired Insight] : insight and judgment were intact [No Anxiety] : not feeling anxious

## 2022-10-20 NOTE — HISTORY OF PRESENT ILLNESS
[FreeTextEntry1] : Continue to feels relatively well.\par Physically active, takes long walks at least  3 times a week and now participates in yoga. There is no exertional discomfort. Walking frequently\par \par No significant exertional chest pain, shortness of breath.Patient denies any chest pain, shortness of breath, palpitations, PND, orthopnea or edema\par \par Other medical history relates to his history of prostate cancer treated 10 years ago with radiation therapy and some chronic radiation  colitis\par S/P ablation for what sounds like a cervical radiculopathy.\par \par 4/6/21 CT with unchanged multiple subcentimeter pulmonary nodules.\par            Mild coronary artery calcifications. He is being followed as part of the [facility] program.\par \par \par

## 2022-10-20 NOTE — HISTORY OF PRESENT ILLNESS
[FreeTextEntry1] : Continue to feels relatively well.\par Physically active, takes long walks at least  3 times a week and now participates in yoga. There is no exertional discomfort. Walking frequently\par \par No significant exertional chest pain, shortness of breath.Patient denies any chest pain, shortness of breath, palpitations, PND, orthopnea or edema\par \par Other medical history relates to his history of prostate cancer treated 10 years ago with radiation therapy and some chronic radiation  colitis\par S/P ablation for what sounds like a cervical radiculopathy.\par \par 4/6/21 CT with unchanged multiple subcentimeter pulmonary nodules.\par            Mild coronary artery calcifications. He is being followed as part of the [facility] program.\par \par \par

## 2023-04-18 ENCOUNTER — APPOINTMENT (OUTPATIENT)
Dept: CARDIOLOGY | Facility: CLINIC | Age: 73
End: 2023-04-18
Payer: MEDICARE

## 2023-04-18 VITALS
HEART RATE: 67 BPM | BODY MASS INDEX: 26.81 KG/M2 | OXYGEN SATURATION: 97 % | SYSTOLIC BLOOD PRESSURE: 112 MMHG | DIASTOLIC BLOOD PRESSURE: 70 MMHG | WEIGHT: 181 LBS | RESPIRATION RATE: 16 BRPM | HEIGHT: 69 IN

## 2023-04-18 PROCEDURE — 99214 OFFICE O/P EST MOD 30 MIN: CPT

## 2023-04-18 PROCEDURE — 93000 ELECTROCARDIOGRAM COMPLETE: CPT

## 2023-04-19 NOTE — REASON FOR VISIT
[FreeTextEntry1] : CLYDE AUSTIN is a 73 year-old M with PMH HTN, HLD, and dilated ascending aorta presents here for cardiac follow-up. \par \par He also has multiple pulmonary nodules and  mild coronary artery calcifications seen on a CT from April 2021 as part of [facility]. 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 \par \par His family history is significant for MI and aortic aneurysm in his father. \par \par Had GI evaluation for anemia recently, underwent a colonoscopy that was normal by his report. Is anticipating an endoscopy in May, cardiac clearance was not requested. Recently started on Iron supplements for anemia. \par \par \par \par \par

## 2023-04-19 NOTE — ASSESSMENT
[FreeTextEntry1] : ECG: Normal sinus rhythm at 67 bpm.  Normal axis intervals and no significant ST or T wave changes\par \par Lab data  \par ----3/9/21---7/16/21--4/23/22--10/14/22\par Chol---123-----128------141-------128\par HDL----46-------46-------48---------45\par LDL-----52 ----- 63-------76---------61\par Tri-----123\par Creat. 0.99\par \par Laboratory data 3/20/19 \par Cholesterol 138\par HDL 50\par LDL 71\par Electrolytes and LFTs are normal\par Hemoglobin 16.3\par \par Abdominal aorta 4/5/2022:\par No evidence of ectasia or enlargement.\par Minimal calcified plaque in the mid abdominal aorta.\par Otherwise unremarkable study\par \par Echocardiogram 4/5/2022:\par Normal left ventricular size wall thickness and function ejection fraction 60 to 65%\par Mild mitral regurgitation\par Mobile interatrial septum without shunt\par Mild dilatation ascending aorta 4 cm\par \par CT of the chest 4/6/2021\par Scattered atherosclerotic calcifications of the left coronary artery.\par Subcentimeter pulmonary nodules noted\par These are unchanged in comparison to a prior CAT scan of one year ago.\par \par Echocardiogram 4/2/2021:\par Normal LV size and function\par  mild dilatation of the aortic root 4.2 cm\par No significant valvular heart disease.\par \par Echocardiogram 10/30/19:\par Normal LV size and systolic function with evidence of diastolic dysfunction.\par Mild dilatation of the ascending aorta 4.1 cm increase of 3.9 cm.\par No significant valvular abnormalities.\par \par Echocardiogram 5/31/18:\par Normal cardiac chamber sizes and function.\par Left ventricular ejection fraction 60%\par Mild dilatation of the ascending aorta 3.8-3.9 cm. Appears to be slightly increased in comparison to the prior echo.\par \par Stress test 4/8/2022:\par Time: 9 minutes 20 seconds (10 METS)\par Heart rate: 146 bpm (99%)\par Blood pressure: 160/84 mmHg\par ECG: Normal no ischemic changes.\par Duke score 9 average\par \par Exercise stress test 4/2/2021:\par Exercise 9 minutes 20 seconds (10 METS)\par Peak heart rate 142 (96%)\par Blood pressure response mildly hypertensive 182/94\par Occasional PVCs and exercise.\par ECG negative for ischemia.\par Duke score 9 low risk\par \par Stress test 3/12/18.\par Exercise 9 minutes (10 minutes). Peak heart rate 153\par Blood pressure response normal\par Chest associated with leg fatigue but no chest pain.\par ECG nonischemic.\par \par Impression\par 1. Hypertension seems to be quite adequately controlled on current regimen. \par \par 2. Exercise tolerance is reasonable without any evidence of exercise-induced ischemia.  Functional capacity and other metrics are the same as on prior stress testing 1 year ago. No anginal symptoms. EKG today without ischemic changes or evidence of old infarct. \par \par 3. Coronary atherosclerotic plaquing noted on the CT scan -  April of 2019.  Consistent with a likely diagnosis of         subclinical atherosclerotic coronary disease. On statin therapy to prevent progression and tolerating this. Most recent LDL 78, would aim to lower this further. \par \par 4. Mild dilatation of the ascending thoracic aorta  and unchanged. \par \par 5.  No evidence of an abdominal aortic aneurysm despite family history of such    \par  \par Plan:\par 1. Increase Atorvastatin to 20 mg daily. Repeat lipids and CMP in 3 months. Aim for LDL less than 70. \par 2. Advised to follow a low-fat and low-carbohydrate diet. \par 3. Continue periodic home BP monitoring. Notify us if he has any persistent elevations. \par 4. Pt advised to exercise for at least 30 minutes most days of the week. Any cardiac symptoms such as chest pain, palpitations or new shortness of breath should be reported.\par 5. Repeat echocardiogram before his next office visit to reassess aortic dilatation. \par \par Clinical followup in 6 months or sooner if needed. \par

## 2023-04-19 NOTE — PHYSICAL EXAM
[General Appearance - Well Developed] : well developed [Normal Appearance] : normal appearance [General Appearance - Well Nourished] : well nourished [Normal Conjunctiva] : the conjunctiva exhibited no abnormalities [Normal Oral Mucosa] : normal oral mucosa [JVD Elevated _____cm] : JVD elevated [unfilled] ~U cm above clavicle [] : no respiratory distress [Respiration, Rhythm And Depth] : normal respiratory rhythm and effort [Skin Color & Pigmentation] : normal skin color and pigmentation [Skin Turgor] : normal skin turgor [Oriented To Time, Place, And Person] : oriented to person, place, and time [Impaired Insight] : insight and judgment were intact [No Anxiety] : not feeling anxious [Normal] : normal S1, S2, no murmur, no rub, no gallop [Murmur] : murmur [de-identified] : I/VI systolic murmur

## 2023-04-19 NOTE — HISTORY OF PRESENT ILLNESS
[FreeTextEntry1] : Patient reports feeling well in general. Continues to remain physically active. Does yoga and walks frequently for exercise. Denies any significant exertional chest pain, palpitations or shortness of breath. Denies any edema, orthopnea or PND. \par \par He monitors his blood pressures at home about twice a week. Reports home blood pressures are typically around 120/80. Admits to dietary indiscretions at times, mostly during the holidays when he eats foods high in carbohydrates. Compliant with statin therapy and tolerating Atorvastatin 10 mg daily. \par \par \par

## 2023-04-19 NOTE — REVIEW OF SYSTEMS
[Negative] : Heme/Lymph [Weight Loss (___ Lbs)] : [unfilled] ~Ulb weight loss [Weight Gain (___ Lbs)] : no recent weight gain [FreeTextEntry2] : Other than as documented here and in the HPI, the thirteen point ROS is negative

## 2023-09-25 ENCOUNTER — APPOINTMENT (OUTPATIENT)
Dept: CARDIOLOGY | Facility: CLINIC | Age: 73
End: 2023-09-25

## 2023-10-03 ENCOUNTER — APPOINTMENT (OUTPATIENT)
Dept: CARDIOLOGY | Facility: CLINIC | Age: 73
End: 2023-10-03
Payer: MEDICARE

## 2023-10-03 PROCEDURE — 93306 TTE W/DOPPLER COMPLETE: CPT

## 2023-10-04 ENCOUNTER — APPOINTMENT (OUTPATIENT)
Dept: CARDIOLOGY | Facility: CLINIC | Age: 73
End: 2023-10-04
Payer: MEDICARE

## 2023-10-04 VITALS
HEIGHT: 69 IN | DIASTOLIC BLOOD PRESSURE: 86 MMHG | BODY MASS INDEX: 26.96 KG/M2 | HEART RATE: 69 BPM | OXYGEN SATURATION: 98 % | RESPIRATION RATE: 16 BRPM | SYSTOLIC BLOOD PRESSURE: 120 MMHG | WEIGHT: 182 LBS

## 2023-10-04 PROCEDURE — 99214 OFFICE O/P EST MOD 30 MIN: CPT

## 2023-10-04 PROCEDURE — 93000 ELECTROCARDIOGRAM COMPLETE: CPT

## 2023-10-04 RX ORDER — ATORVASTATIN CALCIUM 10 MG/1
10 TABLET, FILM COATED ORAL
Qty: 90 | Refills: 3 | Status: DISCONTINUED | COMMUNITY
Start: 2017-08-01 | End: 2023-10-04

## 2023-11-07 ENCOUNTER — RX RENEWAL (OUTPATIENT)
Age: 73
End: 2023-11-07

## 2023-11-07 RX ORDER — ATORVASTATIN CALCIUM 20 MG/1
20 TABLET, FILM COATED ORAL
Qty: 90 | Refills: 1 | Status: ACTIVE | COMMUNITY
Start: 2023-04-18 | End: 1900-01-01

## 2024-03-25 ENCOUNTER — APPOINTMENT (OUTPATIENT)
Dept: CARDIOLOGY | Facility: CLINIC | Age: 74
End: 2024-03-25
Payer: MEDICARE

## 2024-03-25 VITALS
SYSTOLIC BLOOD PRESSURE: 120 MMHG | RESPIRATION RATE: 16 BRPM | OXYGEN SATURATION: 95 % | DIASTOLIC BLOOD PRESSURE: 78 MMHG | WEIGHT: 184 LBS | HEART RATE: 70 BPM | BODY MASS INDEX: 27.25 KG/M2 | HEIGHT: 69 IN

## 2024-03-25 DIAGNOSIS — I77.810 THORACIC AORTIC ECTASIA: ICD-10-CM

## 2024-03-25 DIAGNOSIS — I10 ESSENTIAL (PRIMARY) HYPERTENSION: ICD-10-CM

## 2024-03-25 DIAGNOSIS — I25.10 ATHEROSCLEROTIC HEART DISEASE OF NATIVE CORONARY ARTERY W/OUT ANGINA PECTORIS: ICD-10-CM

## 2024-03-25 DIAGNOSIS — E78.5 HYPERLIPIDEMIA, UNSPECIFIED: ICD-10-CM

## 2024-03-25 PROCEDURE — 93000 ELECTROCARDIOGRAM COMPLETE: CPT

## 2024-03-25 PROCEDURE — G2211 COMPLEX E/M VISIT ADD ON: CPT

## 2024-03-25 PROCEDURE — 99214 OFFICE O/P EST MOD 30 MIN: CPT

## 2024-03-25 NOTE — ASSESSMENT
[FreeTextEntry1] : ECG: Normal sinus rhythm at 70 bpm.  Normal axis intervals and no significant ST or T wave changes  Lab data   ----3/9/21---7/16/21--4/23/22--10/14/22--1/13/23---7/21/23--1/12/24 Chol---123-----128------141-------128---------144------118---------108 HDL----46-------46-------48---------45-----------46-------46-----------40 LDL-----52 ----- 63-------76---------61-----------78-------55-----------50 Tri-----123---------------------------------------------------------------------93 Creat. 0.99  Laboratory data 3/20/19  Cholesterol 138 HDL 50 LDL 71 Electrolytes and LFTs are normal Hemoglobin 16.3  Abdominal aorta 4/5/2022: No evidence of ectasia or enlargement. Minimal calcified plaque in the mid abdominal aorta. Otherwise unremarkable study  Echocardiogram 10/3/2023   Normal LV size and function Mildly dilated aortic root and ascending aorta No obvious intracardiac shunt flow.  Echocardiogram 4/5/2022: Normal left ventricular size wall thickness and function ejection fraction 60 to 65% Mild mitral regurgitation Mobile interatrial septum without shunt Mild dilatation ascending aorta 4 cm  CT of the chest 4/6/2021 Scattered atherosclerotic calcifications of the left coronary artery. Subcentimeter pulmonary nodules noted These are unchanged in comparison to a prior CAT scan of one year ago.  Echocardiogram 4/2/2021: Normal LV size and function  mild dilatation of the aortic root 4.2 cm No significant valvular heart disease.  Echocardiogram 10/30/19: Normal LV size and systolic function with evidence of diastolic dysfunction. Mild dilatation of the ascending aorta 4.1 cm increase of 3.9 cm. No significant valvular abnormalities.  Echocardiogram 5/31/18: Normal cardiac chamber sizes and function. Left ventricular ejection fraction 60% Mild dilatation of the ascending aorta 3.8-3.9 cm. Appears to be slightly increased in comparison to the prior echo.  Stress test 4/8/2022: Time: 9 minutes 20 seconds (10 METS) Heart rate: 146 bpm (99%) Blood pressure: 160/84 mmHg ECG: Normal no ischemic changes. Duke score 9 average  Exercise stress test 4/2/2021: Exercise 9 minutes 20 seconds (10 METS) Peak heart rate 142 (96%) Blood pressure response mildly hypertensive 182/94 Occasional PVCs and exercise. ECG negative for ischemia. Duke score 9 low risk  Stress test 3/12/18. Exercise 9 minutes (10 minutes). Peak heart rate 153 Blood pressure response normal Chest associated with leg fatigue but no chest pain. ECG nonischemic.  Impression 1. Hypertension seems to be quite adequately controlled on current regimen.   2. Exercise tolerance is reasonable without any evidence of exercise-induced ischemia.  Functional capacity and other metrics are the same as on prior stress testing 1 year ago. No anginal symptoms. EKG today without ischemic changes or evidence of old infarct.   3. Coronary atherosclerotic plaquing noted on the CT scan -  April of 2019.  Consistent with a likely diagnosis of         subclinical atherosclerotic coronary disease. On statin therapy to prevent progression and tolerating this. Since increase of atorvastatin to 20 mg daily April 2023, LDL has been solidly at target  4. Mild dilatation of the ascending thoracic aorta and unchanged.   5.  No evidence of an abdominal aortic aneurysm despite family history of such       Plan: 1.  Continue atorvastatin to 20 mg daily. Repeat lipids and CMP in 3 months. Aim for LDL less than 70.  2. Advised to follow a low-fat and low-carbohydrate diet.  3. Continue periodic home BP monitoring. Notify us if he has any persistent elevations.  4. Pt advised to exercise for at least 30 minutes most days of the week. Any cardiac symptoms such as chest pain, palpitations or new shortness of breath should be reported. 5. Repeat echocardiogram before his next office visit to reassess aortic dilatation. 6.  Exercise stress testing to reassess with regard to atherosclerotic calcification and other risk factors before next visit  Clinical followup in 6 months or sooner if needed.

## 2024-03-25 NOTE — REASON FOR VISIT
[FreeTextEntry1] : CLYDE AUSTIN is a 74 year-old M with PMH HTN, HLD, and dilated ascending aorta presents here for cardiac follow-up.   He also has multiple pulmonary nodules and  mild coronary artery calcifications seen on a CT from April 2021 as part of [facility]. He was a 9/11  ("[employer]d [facility] [employer]).   His family history is significant for MI and aortic aneurysm in his father.   Had GI evaluation for anemia.  The endoscopy colonoscopy are unremarkable and the anemia has entirely resolved.

## 2024-03-25 NOTE — HISTORY OF PRESENT ILLNESS
[FreeTextEntry1] : Patient reports feeling well in general. Continues to remain physically active. Does yoga and walks frequently for exercise. Denies any significant exertional chest pain, palpitations or shortness of breath. Denies any edema, orthopnea or PND.   He monitors his blood pressures at home about twice a week. Reports home blood pressures are typically around 120/80. Admits to dietary indiscretions at times, mostly during the holidays when he eats foods high in carbohydrates. Compliant with statin therapy and tolerating Atorvastatin 20 mg daily.

## 2024-03-25 NOTE — PHYSICAL EXAM
[Normal] : normal S1, S2, no murmur, no rub, no gallop [Murmur] : murmur [General Appearance - Well Developed] : well developed [Normal Appearance] : normal appearance [General Appearance - Well Nourished] : well nourished [Normal Conjunctiva] : the conjunctiva exhibited no abnormalities [Normal Oral Mucosa] : normal oral mucosa [JVD Elevated _____cm] : JVD elevated [unfilled] ~U cm above clavicle [] : no respiratory distress [Respiration, Rhythm And Depth] : normal respiratory rhythm and effort [Skin Color & Pigmentation] : normal skin color and pigmentation [Skin Turgor] : normal skin turgor [Oriented To Time, Place, And Person] : oriented to person, place, and time [Impaired Insight] : insight and judgment were intact [No Anxiety] : not feeling anxious [de-identified] : I/VI systolic murmur

## 2024-03-25 NOTE — REVIEW OF SYSTEMS
[Negative] : Heme/Lymph [FreeTextEntry2] : Other than as documented here and in the HPI, the thirteen point ROS is negative [Weight Gain (___ Lbs)] : no recent weight gain [Weight Loss (___ Lbs)] : no recent weight loss

## 2024-08-29 ENCOUNTER — APPOINTMENT (OUTPATIENT)
Dept: CARDIOLOGY | Facility: CLINIC | Age: 74
End: 2024-08-29

## 2024-08-29 PROCEDURE — 93306 TTE W/DOPPLER COMPLETE: CPT

## 2024-09-17 ENCOUNTER — RX RENEWAL (OUTPATIENT)
Age: 74
End: 2024-09-17

## 2024-09-19 ENCOUNTER — APPOINTMENT (OUTPATIENT)
Dept: CARDIOLOGY | Facility: CLINIC | Age: 74
End: 2024-09-19
Payer: MEDICARE

## 2024-09-19 PROCEDURE — 93015 CV STRESS TEST SUPVJ I&R: CPT

## 2024-09-24 ENCOUNTER — APPOINTMENT (OUTPATIENT)
Dept: CARDIOLOGY | Facility: CLINIC | Age: 74
End: 2024-09-24
Payer: MEDICARE

## 2024-09-24 VITALS
RESPIRATION RATE: 16 BRPM | DIASTOLIC BLOOD PRESSURE: 70 MMHG | HEIGHT: 69 IN | WEIGHT: 175 LBS | OXYGEN SATURATION: 96 % | SYSTOLIC BLOOD PRESSURE: 122 MMHG | HEART RATE: 86 BPM | BODY MASS INDEX: 25.92 KG/M2

## 2024-09-24 DIAGNOSIS — I25.10 ATHEROSCLEROTIC HEART DISEASE OF NATIVE CORONARY ARTERY W/OUT ANGINA PECTORIS: ICD-10-CM

## 2024-09-24 DIAGNOSIS — I10 ESSENTIAL (PRIMARY) HYPERTENSION: ICD-10-CM

## 2024-09-24 DIAGNOSIS — I48.0 PAROXYSMAL ATRIAL FIBRILLATION: ICD-10-CM

## 2024-09-24 DIAGNOSIS — I77.810 THORACIC AORTIC ECTASIA: ICD-10-CM

## 2024-09-24 DIAGNOSIS — E78.5 HYPERLIPIDEMIA, UNSPECIFIED: ICD-10-CM

## 2024-09-24 PROCEDURE — G2211 COMPLEX E/M VISIT ADD ON: CPT

## 2024-09-24 PROCEDURE — 99214 OFFICE O/P EST MOD 30 MIN: CPT

## 2024-09-24 RX ORDER — LOSARTAN POTASSIUM 50 MG/1
50 TABLET, FILM COATED ORAL DAILY
Qty: 90 | Refills: 3 | Status: ACTIVE | COMMUNITY
Start: 2024-09-24 | End: 1900-01-01

## 2024-09-24 RX ORDER — METOPROLOL SUCCINATE 25 MG/1
25 TABLET, EXTENDED RELEASE ORAL DAILY
Qty: 90 | Refills: 3 | Status: ACTIVE | COMMUNITY
Start: 2024-09-24 | End: 1900-01-01

## 2024-09-24 NOTE — REVIEW OF SYSTEMS
[Negative] : Heme/Lymph [Weight Gain (___ Lbs)] : no recent weight gain [Weight Loss (___ Lbs)] : no recent weight loss [FreeTextEntry2] : Other than as documented here and in the HPI, the thirteen point ROS is negative

## 2024-09-24 NOTE — HISTORY OF PRESENT ILLNESS
[FreeTextEntry1] : Patient reports feeling well in general. Continues to remain physically active. Does yoga and walks frequently for exercise. Denies any significant exertional chest pain, palpitations or shortness of breath. Denies any edema, orthopnea or PND.   He monitors his blood pressures at home about twice a week. Reports home blood pressures are typically around 120/80. Admits to dietary indiscretions at times, mostly during the holidays when he eats foods high in carbohydrates. Compliant with statin therapy and tolerating Atorvastatin 20 mg daily.   Patient here to review the results of an exercise stress test and an echocardiogram.

## 2024-09-24 NOTE — REASON FOR VISIT
[FreeTextEntry1] : CLYDE AUSTIN is a 74 year-old M with PMH HTN, HLD, and dilated ascending aorta presents here for cardiac follow-up.   He also has multiple pulmonary nodules and  mild coronary artery calcifications seen on a CT from April 2021 as part of [facility]. He was a 9/11  ([facility]).   + family history--MI and aortic aneurysm - his father.   Had GI evaluation for anemia.  The endoscopy colonoscopy are unremarkable and the anemia has entirely resolved.

## 2024-09-24 NOTE — PHYSICAL EXAM
[Normal] : normal S1, S2, no murmur, no rub, no gallop [Murmur] : murmur [General Appearance - Well Developed] : well developed [Normal Appearance] : normal appearance [General Appearance - Well Nourished] : well nourished [Normal Conjunctiva] : the conjunctiva exhibited no abnormalities [Normal Oral Mucosa] : normal oral mucosa [JVD Elevated _____cm] : JVD elevated [unfilled] ~U cm above clavicle [] : no respiratory distress [Respiration, Rhythm And Depth] : normal respiratory rhythm and effort [Skin Color & Pigmentation] : normal skin color and pigmentation [Skin Turgor] : normal skin turgor [Oriented To Time, Place, And Person] : oriented to person, place, and time [Impaired Insight] : insight and judgment were intact [No Anxiety] : not feeling anxious [de-identified] : I/VI systolic murmur

## 2024-09-24 NOTE — ASSESSMENT
[FreeTextEntry1] :  [id number] (Hypertension, aortic plaque, age, greater than 75)  Lab data   ----3/9/21---7/16/21--4/23/22--10/14/22--1/13/23---7/21/23--1/12/24--7/16/24 Chol---123-----128------141-------128---------144------118---------108------120 HDL----46-------46-------48---------45-----------46-------46-----------40--------49 LDL-----52 ----- 63-------76---------61-----------78-------55-----------50--------51 Tri-----123---------------------------------------------------------------------93--------114 Creat. 0.99  Laboratory data 3/20/19  Cholesterol 138 HDL 50 LDL 71 Electrolytes and LFTs are normal Hemoglobin 16.3  Abdominal aorta 4/5/2022: No evidence of ectasia or enlargement. Minimal calcified plaque in the mid abdominal aorta. Otherwise unremarkable study  Echocardiogram 8/29/2024 Normal LV size and function Mild diastolic filling abnormality Focal aortic valve sclerosis Mildly dilated ascending aorta 4.1 cm essentially unchanged from prior.  Echocardiogram 10/3/2023   Normal LV size and function Mildly dilated aortic root and ascending aorta No obvious intracardiac shunt flow.  Echocardiogram 4/5/2022: Normal left ventricular size wall thickness and function ejection fraction 60 to 65% Mild mitral regurgitation Mobile interatrial septum without shunt Mild dilatation ascending aorta 4 cm  CT of the chest 4/6/2021 Scattered atherosclerotic calcifications of the left coronary artery. Subcentimeter pulmonary nodules noted These are unchanged in comparison to a prior CAT scan of one year ago.  Echocardiogram 4/2/2021: Normal LV size and function  mild dilatation of the aortic root 4.2 cm No significant valvular heart disease.  Echocardiogram 10/30/19: Normal LV size and systolic function with evidence of diastolic dysfunction. Mild dilatation of the ascending aorta 4.1 cm increase of 3.9 cm. No significant valvular abnormalities.  Echocardiogram 5/31/18: Normal cardiac chamber sizes and function. Left ventricular ejection fraction 60% Mild dilatation of the ascending aorta 3.8-3.9 cm. Appears to be slightly increased in comparison to the prior echo.  Stress test 9/19/2024 Time: 9 minutes 38 seconds (11 METS) Heart rate: 206 (patient was at 150 bpm at peak exercise and then converted to atrial fibrillation for a short period of time resolving early in recovery). ECG: rate related changes were noted during A-fib but otherwise there were none.  Stress test 4/8/2022: Time: 9 minutes 20 seconds (10 METS) Heart rate: 146 bpm (99%) Blood pressure: 160/84 mmHg ECG: Normal no ischemic changes. Duke score 9 average  Exercise stress test 4/2/2021: Exercise 9 minutes 20 seconds (10 METS) Peak heart rate 142 (96%) Blood pressure response mildly hypertensive 182/94 Occasional PVCs and exercise. ECG negative for ischemia. Duke score 9 low risk  Stress test 3/12/18. Exercise 9 minutes (10 minutes). Peak heart rate 153 Blood pressure response normal Chest associated with leg fatigue but no chest pain. ECG nonischemic.  Impression 1. Hypertension seems to be quite adequately controlled on current regimen.   2. Exercise tolerance is reasonable without any evidence of exercise-induced ischemia.       Exercise triggered PAF at rates of 200 bpm.     Patient seemed unaware of this and does not report any other episodes of rapid heartbeat or irregular heartbeats      at rest or with exercise.     [id number].  But really unclear whether this was a very singular short-lived event or reflects a more frequent problem.  3. Coronary atherosclerotic plaquing noted on the CT scan -  April of 2019.  Consistent with a likely diagnosis of         subclinical atherosclerotic coronary disease. On statin therapy to prevent progression and tolerating this. Since increase of atorvastatin to 20 mg daily April 2023, LDL has been solidly at target  4. Mild dilatation of the ascending thoracic aorta which remains unchanged on the most current echocardiogram.  5.  No evidence of an abdominal aortic aneurysm despite family history of such       Plan: 1.  Continue atorvastatin to 20 mg daily. Repeat lipids and CMP in 3 months. Aim for LDL less than 70.  2. Advised to follow a low-fat and low-carbohydrate diet.  3.  Change losartan HCT to losartan 50 without the HCT.      Begin Toprol-XL 25 mg nightly.      Begin aspirin 81 mg p.o. daily.  The indications and reasoning behind all of these changes were discussed with       the patient.  Continue periodic home BP monitoring. Notify us if he has any persistent elevations.  4. Pt advised to exercise for at least 30 minutes most days of the week. Any cardiac symptoms such as chest pain, palpitations or new shortness of breath should be reported. Advised patient to purchase a home monitoring device such as [employer]eta[employer] and use it to track his heart rate and rhythm.  He can contact us if he has any questions about the rhythm see downloads. While there is a [id number] of 4, not clear that patient has yet declared himself as needing long-term anticoagulation at this point. Aspirin is indicated because of the aortic plaquing and coronary calcifications in any case. 5. Repeat echocardiogram in 1 year.   Clinical followup in 3 months or sooner if needed.

## 2024-12-18 ENCOUNTER — APPOINTMENT (OUTPATIENT)
Dept: CARDIOLOGY | Facility: CLINIC | Age: 74
End: 2024-12-18
Payer: MEDICARE

## 2024-12-18 VITALS
DIASTOLIC BLOOD PRESSURE: 80 MMHG | WEIGHT: 180 LBS | BODY MASS INDEX: 26.66 KG/M2 | SYSTOLIC BLOOD PRESSURE: 132 MMHG | OXYGEN SATURATION: 95 % | RESPIRATION RATE: 16 BRPM | HEIGHT: 69 IN | HEART RATE: 65 BPM

## 2024-12-18 DIAGNOSIS — E78.5 HYPERLIPIDEMIA, UNSPECIFIED: ICD-10-CM

## 2024-12-18 DIAGNOSIS — I48.0 PAROXYSMAL ATRIAL FIBRILLATION: ICD-10-CM

## 2024-12-18 DIAGNOSIS — I77.810 THORACIC AORTIC ECTASIA: ICD-10-CM

## 2024-12-18 DIAGNOSIS — I10 ESSENTIAL (PRIMARY) HYPERTENSION: ICD-10-CM

## 2024-12-18 DIAGNOSIS — I25.10 ATHEROSCLEROTIC HEART DISEASE OF NATIVE CORONARY ARTERY W/OUT ANGINA PECTORIS: ICD-10-CM

## 2024-12-18 PROCEDURE — G2211 COMPLEX E/M VISIT ADD ON: CPT

## 2024-12-18 PROCEDURE — 99214 OFFICE O/P EST MOD 30 MIN: CPT

## 2024-12-18 PROCEDURE — 93000 ELECTROCARDIOGRAM COMPLETE: CPT

## 2024-12-18 RX ORDER — ASPIRIN ENTERIC COATED TABLETS 81 MG 81 MG/1
81 TABLET, DELAYED RELEASE ORAL DAILY
Refills: 0 | Status: ACTIVE | COMMUNITY

## 2025-04-10 ENCOUNTER — APPOINTMENT (OUTPATIENT)
Dept: CARDIOLOGY | Facility: CLINIC | Age: 75
End: 2025-04-10
Payer: MEDICARE

## 2025-04-10 VITALS
WEIGHT: 175 LBS | HEART RATE: 76 BPM | SYSTOLIC BLOOD PRESSURE: 118 MMHG | DIASTOLIC BLOOD PRESSURE: 75 MMHG | RESPIRATION RATE: 18 BRPM | OXYGEN SATURATION: 98 % | HEIGHT: 69 IN | BODY MASS INDEX: 25.92 KG/M2

## 2025-04-10 DIAGNOSIS — I77.810 THORACIC AORTIC ECTASIA: ICD-10-CM

## 2025-04-10 DIAGNOSIS — I25.10 ATHEROSCLEROTIC HEART DISEASE OF NATIVE CORONARY ARTERY W/OUT ANGINA PECTORIS: ICD-10-CM

## 2025-04-10 DIAGNOSIS — I10 ESSENTIAL (PRIMARY) HYPERTENSION: ICD-10-CM

## 2025-04-10 DIAGNOSIS — E78.5 HYPERLIPIDEMIA, UNSPECIFIED: ICD-10-CM

## 2025-04-10 PROCEDURE — 93000 ELECTROCARDIOGRAM COMPLETE: CPT

## 2025-04-10 PROCEDURE — G2211 COMPLEX E/M VISIT ADD ON: CPT

## 2025-04-10 PROCEDURE — 99214 OFFICE O/P EST MOD 30 MIN: CPT
